# Patient Record
Sex: FEMALE | Race: WHITE
[De-identification: names, ages, dates, MRNs, and addresses within clinical notes are randomized per-mention and may not be internally consistent; named-entity substitution may affect disease eponyms.]

---

## 2019-02-24 ENCOUNTER — HOSPITAL ENCOUNTER (EMERGENCY)
Dept: HOSPITAL 75 - ER FS | Age: 69
Discharge: ANOTHER HOSPITAL | End: 2019-02-24
Payer: COMMERCIAL

## 2019-02-24 VITALS — DIASTOLIC BLOOD PRESSURE: 85 MMHG | SYSTOLIC BLOOD PRESSURE: 101 MMHG

## 2019-02-24 VITALS — BODY MASS INDEX: 29.16 KG/M2 | WEIGHT: 175 LBS | HEIGHT: 65 IN

## 2019-02-24 DIAGNOSIS — Z79.02: ICD-10-CM

## 2019-02-24 DIAGNOSIS — H54.7: Primary | ICD-10-CM

## 2019-02-24 DIAGNOSIS — M32.9: ICD-10-CM

## 2019-02-24 DIAGNOSIS — Z98.890: ICD-10-CM

## 2019-02-24 DIAGNOSIS — E05.90: ICD-10-CM

## 2019-02-24 DIAGNOSIS — M35.00: ICD-10-CM

## 2019-02-24 DIAGNOSIS — Z86.73: ICD-10-CM

## 2019-02-24 DIAGNOSIS — M33.20: ICD-10-CM

## 2019-02-24 DIAGNOSIS — Z79.82: ICD-10-CM

## 2019-02-24 DIAGNOSIS — I10: ICD-10-CM

## 2019-02-24 DIAGNOSIS — Z88.1: ICD-10-CM

## 2019-02-24 LAB
ALBUMIN SERPL-MCNC: 4.2 GM/DL (ref 3.2–4.5)
ALP SERPL-CCNC: 91 U/L (ref 40–136)
ALT SERPL-CCNC: 13 U/L (ref 0–55)
APTT BLD: 29 SEC (ref 24–35)
BASOPHILS # BLD AUTO: 0 10^3/UL (ref 0–0.1)
BASOPHILS NFR BLD AUTO: 0 % (ref 0–10)
BILIRUB SERPL-MCNC: 0.4 MG/DL (ref 0.1–1)
BUN/CREAT SERPL: 18
CALCIUM SERPL-MCNC: 9.5 MG/DL (ref 8.5–10.1)
CHLORIDE SERPL-SCNC: 102 MMOL/L (ref 98–107)
CO2 SERPL-SCNC: 26 MMOL/L (ref 21–32)
CREAT SERPL-MCNC: 0.79 MG/DL (ref 0.6–1.3)
EOSINOPHIL # BLD AUTO: 0 10^3/UL (ref 0–0.3)
EOSINOPHIL NFR BLD AUTO: 0 % (ref 0–10)
ERYTHROCYTE [DISTWIDTH] IN BLOOD BY AUTOMATED COUNT: 12.5 % (ref 10–14.5)
GFR SERPLBLD BASED ON 1.73 SQ M-ARVRAT: > 60 ML/MIN
GLUCOSE SERPL-MCNC: 110 MG/DL (ref 70–105)
HCT VFR BLD CALC: 47 % (ref 35–52)
HGB BLD-MCNC: 15.4 G/DL (ref 11.5–16)
INR PPP: 1 (ref 0.8–1.4)
LYMPHOCYTES # BLD AUTO: 2.9 X 10^3 (ref 1–4)
LYMPHOCYTES NFR BLD AUTO: 31 % (ref 12–44)
MAGNESIUM SERPL-MCNC: 1.9 MG/DL (ref 1.8–2.4)
MANUAL DIFFERENTIAL PERFORMED BLD QL: NO
MCH RBC QN AUTO: 30 PG (ref 25–34)
MCHC RBC AUTO-ENTMCNC: 33 G/DL (ref 32–36)
MCV RBC AUTO: 91 FL (ref 80–99)
MONOCYTES # BLD AUTO: 0.8 X 10^3 (ref 0–1)
MONOCYTES NFR BLD AUTO: 9 % (ref 0–12)
NEUTROPHILS # BLD AUTO: 5.6 X 10^3 (ref 1.8–7.8)
NEUTROPHILS NFR BLD AUTO: 60 % (ref 42–75)
PLATELET # BLD: 253 10^3/UL (ref 130–400)
PMV BLD AUTO: 13.7 FL (ref 7.4–10.4)
POTASSIUM SERPL-SCNC: 3.4 MMOL/L (ref 3.6–5)
PROT SERPL-MCNC: 7.4 GM/DL (ref 6.4–8.2)
PROTHROMBIN TIME: 13 SEC (ref 12.2–14.7)
SODIUM SERPL-SCNC: 142 MMOL/L (ref 135–145)
WBC # BLD AUTO: 9.4 10^3/UL (ref 4.3–11)

## 2019-02-24 PROCEDURE — 71045 X-RAY EXAM CHEST 1 VIEW: CPT

## 2019-02-24 PROCEDURE — 36415 COLL VENOUS BLD VENIPUNCTURE: CPT

## 2019-02-24 PROCEDURE — 93041 RHYTHM ECG TRACING: CPT

## 2019-02-24 PROCEDURE — 80053 COMPREHEN METABOLIC PANEL: CPT

## 2019-02-24 PROCEDURE — 85730 THROMBOPLASTIN TIME PARTIAL: CPT

## 2019-02-24 PROCEDURE — 83735 ASSAY OF MAGNESIUM: CPT

## 2019-02-24 PROCEDURE — 85610 PROTHROMBIN TIME: CPT

## 2019-02-24 PROCEDURE — 93005 ELECTROCARDIOGRAM TRACING: CPT

## 2019-02-24 PROCEDURE — 85025 COMPLETE CBC W/AUTO DIFF WBC: CPT

## 2019-02-24 PROCEDURE — 70450 CT HEAD/BRAIN W/O DYE: CPT

## 2019-02-24 PROCEDURE — 84484 ASSAY OF TROPONIN QUANT: CPT

## 2019-02-24 NOTE — XMS REPORT
[facility]

 Created on: 2018



Mohsen Sosa

External Reference #: [id number]

: 1950

Sex: Female



Demographics







 Address  [address] [place]  [address]

 

 Preferred Language  Unknown

 

 Marital Status  Unknown

 

 [employer] Affiliation  Unknown

 

 Race  Unknown

 

 Ethnic Group  Unknown





Author







 Author  JASONDARWIN TERRY

 

 Organization  [facility]

 

 Address  Unknown

 

 Phone  [phone]







Care Team Providers







 Care Team Member Name  Role  Phone

 

 DARWIN ORO  Unavailable  [phone]







PROBLEMS

Unknown Problems



ALLERGIES

No Information



ENCOUNTERS







 Encounter  Location  Date  Diagnosis

 

 [facility]  [id number], [place] 
[id number]    Dental examination Z01.20

 

 [facility]  [id number], [place] 
[id number]  08 May, 2015  Dental examination V72.2







IMMUNIZATIONS

No Known Immunizations



SOCIAL HISTORY

Never Assessed



REASON FOR VISIT

THA



PLAN OF CARE







 Activity  Details









  









 Follow Up  prn Reason:filling







VITAL SIGNS





MEDICATIONS







 Medication  Instructions  Dosage  Frequency  Start Date  End Date  Duration  
Status

 

 Fenofibrate                    Active

 

 Aspirin                    Active

 

 Xanax                    Active

 

 Plavix                    Active

 

 Lisinopril                    Active

 

 Synthroid                    Active

 

 Prolia                    Active







RESULTS

No Results



PROCEDURES







 Procedure  Date Ordered  Result  Body Site

 

 LTD ORAL EVALUATION - PROBLEM FOCUS  2017      

 

 INTRAORL-PERIAPICAL 1 FILM [id number]  2017      







INSTRUCTIONS





MEDICATIONS ADMINISTERED

No Known Medications

## 2019-02-24 NOTE — DIAGNOSTIC IMAGING REPORT
INDICATION: Hypertension 



COMPARISON: None.



FINDINGS:

Single view chest demonstrate some minimal cardiac enlargement.

Chronic interstitial changes are seen in both bases. There is no

pneumothorax, effusion or acute infiltrate. Osseous structures

are age-appropriate.



 IMPRESSION: Cardiac enlargement without pulmonary edema or acute

infiltrate.



Dictated by: 



  Dictated on workstation # [id number]

## 2019-02-24 NOTE — DIAGNOSTIC IMAGING REPORT
PROCEDURE: CT head without contrast.



TECHNIQUE: Multiple contiguous axial images were obtained through

the brain without the use of intravenous contrast.



INDICATION:  Headache and hypertension  



FINDINGS:

Ventricles normal in size, shape and position. There is no

midline shift or mass effect. There is no hemorrhage or evidence

of acute ischemia. There is no extra-axial fluid collection. The

paranasal sinuses, mastoids and bony calvarium is normal.



IMPRESSION: No acute intracranial abnormalities.



Dictated by: 



  Dictated on workstation # [id number]

## 2019-02-24 NOTE — ED NEUROLOGICAL PROBLEM
General


Chief Complaint:  Cardiac/General Problems


Stated Complaint:  HIGH BLOOD PRESSURE PER HOME TEST


Nursing Triage Note:  


ARRIVED VIA AMB TO ROOM 05.  COMPLAINS OF HYPERTENSION AND VISION CHANGES SINCE 


THURSDAY.  STATES SHE WAS SEEN AT URGENT CARE AND THEY THOUGHT IT WAS HER 


SINUSES


Nursing Sepsis Screen:  No Definite Risk





History of Present Illness


Date Seen by Provider:  Feb 24, 2019


Time Seen by Provider:  09:05


This is a 68-year-old female with a history of lupus, Sjogren syndrome, 

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 

secondary to bruising, hypertension, here for elevated blood pressure readings 

over the last 4 days. The highest value was 140/100 which was today at home. 

When she had strokes in the past she had difficulty with ambulation, some 

component of vertigo or ataxia. She is not having these symptoms recently 

however she does admit to having some generalized blurring her vision which has 

been slightly asymmetric worse on the left than on the right. No focal weakness

, numbness, or tingling. No headache. No neck pain. She has had some nasal 

congestion and a slightly scratchy throat, she was prescribed Augmentin when 

she was seen at urgent care but did not wind up taking this prescription. She 

has not had fevers, facial pain, purulent nasal discharge. No chest pain or 

shortness of breath. No other symptoms.





Allergies and Home Medications


Allergies


Coded Allergies:  


     cephalexin (Verified  Allergy, Unknown, 2/24/19)


     azithromycin (Verified  Adverse Reaction, Unknown, 2/24/19)


 


 STATES SHE CANT TAKE IT DUE TO TAKING PLAVIX


     doxycycline (Verified  Adverse Reaction, Unknown, REFLUX, 2/24/19)





Patient Home Medication List


Home Medication List Reviewed:  Yes





Review of Systems


Review of Systems


Constitutional:  no symptoms reported


Eyes:  Blurred Vision


Ears, Nose, Mouth, Throat:  see HPI


Respiratory:  no symptoms reported


Cardiovascular:  no symptoms reported


Gastrointestinal:  no symptoms reported


Genitourinary:  no symptoms reported


Musculoskeletal:  no symptoms reported


Skin:  no symptoms reported


Psychiatric/Neurological:  See HPI, Anxiety; Denies Headache


Endocrine:  See HPI


Hematologic/Lymphatic:  No Symptoms Reported





Past Medical-Social-Family Hx


Patient Social History


Recent Foreign Travel:  No


Contact w/Someone Who Travel:  No


Recent Infectious Disease Expo:  No


Recent Hopitalizations:  No





Seasonal Allergies


Seasonal Allergies:  No





Past Medical History


Gallbladder, Orthopedic


Respiratory:  No


Cardiac:  Yes


Hypertension


Neurological:  Yes


Stroke


Genitourinary:  No


Gastrointestinal:  No


Musculoskeletal:  No


Endocrine:  Yes (PIT TUMOR)


Hyperthyroidism


Cancer:  No


Psychosocial:  No


Integumentary:  No





Physical Exam


Vital Signs





Vital Signs - First Documented








 2/24/19





 08:25


 


Temp 98.0


 


Pulse 64


 


Resp 16


 


B/P (MAP) 156/76 (102)


 


Pulse Ox 98


 


O2 Delivery Room Air





Capillary Refill : Less Than 3 Seconds


Height, Weight, BMI


Height: 5'5.00"


Weight: 175lbs. oz. 79.109980gz;  BMI


Method:Stated


General Appearance:  no apparent distress


HEENT:  PERRL/EOMI, normal ENT inspection, pharynx normal, other (no tenderness 

of frontal or maxillary sinuses.  Funduscopic exam is grossly unremarkable w no 

papilledema, no flame hemorrhages or cotton wool spots)


Neck:  full range of motion; No carotid bruit


Respiratory:  lungs clear


Cardiovascular:  normal peripheral pulses (slightly decreased left radial pulse 

which is baseline patient states), regular rate, rhythm


Gastrointestinal:  non tender, soft


Neurologic/Psychiatric:  CNs II-XII nml as tested, no motor/sensory deficits, 

alert, normal mood/affect, oriented x 3; No abnormal cerebellar tests, No 

abnormal gait


Skin:  warm/dry





Progress/Results/Core Measures


Results/Orders


Lab Results





Laboratory Tests








Test


 2/24/19


09:37 Range/Units


 


 


White Blood Count


 9.4 


 4.3-11.0


10^3/uL


 


Red Blood Count


 5.22 


 4.35-5.85


10^6/uL


 


Hemoglobin 15.4  11.5-16.0  G/DL


 


Hematocrit 47  35-52  %


 


Mean Corpuscular Volume 91  80-99  FL


 


Mean Corpuscular Hemoglobin 30  25-34  PG


 


Mean Corpuscular Hemoglobin


Concent 33 


 32-36  G/DL





 


Red Cell Distribution Width 12.5  10.0-14.5  %


 


Platelet Count


 253 


 130-400


10^3/uL


 


Mean Platelet Volume 13.7 H 7.4-10.4  FL


 


Neutrophils (%) (Auto) 60  42-75  %


 


Lymphocytes (%) (Auto) 31  12-44  %


 


Monocytes (%) (Auto) 9  0-12  %


 


Eosinophils (%) (Auto) 0  0-10  %


 


Basophils (%) (Auto) 0  0-10  %


 


Neutrophils # (Auto) 5.6  1.8-7.8  X 10^3


 


Lymphocytes # (Auto) 2.9  1.0-4.0  X 10^3


 


Monocytes # (Auto) 0.8  0.0-1.0  X 10^3


 


Eosinophils # (Auto)


 0.0 


 0.0-0.3


10^3/uL


 


Basophils # (Auto)


 0.0 


 0.0-0.1


10^3/uL


 


Prothrombin Time 13.0  12.2-14.7  SEC


 


INR Comment 1.0  0.8-1.4  


 


Activated Partial


Thromboplast Time 29 


 24-35  SEC





 


Sodium Level 142  135-145  MMOL/L


 


Potassium Level 3.4 L 3.6-5.0  MMOL/L


 


Chloride Level 102    MMOL/L


 


Carbon Dioxide Level 26  21-32  MMOL/L


 


Anion Gap 14  5-14  MMOL/L


 


Blood Urea Nitrogen 14  7-18  MG/DL


 


Creatinine


 0.79 


 0.60-1.30


MG/DL


 


Estimat Glomerular Filtration


Rate > 60 


  





 


BUN/Creatinine Ratio 18   


 


Glucose Level 110 H   MG/DL


 


Calcium Level 9.5  8.5-10.1  MG/DL


 


Corrected Calcium 9.3  8.5-10.1  MG/DL


 


Magnesium Level 1.9  1.8-2.4  MG/DL


 


Total Bilirubin 0.4  0.1-1.0  MG/DL


 


Aspartate Amino Transf


(AST/SGOT) 21 


 5-34  U/L





 


Alanine Aminotransferase


(ALT/SGPT) 13 


 0-55  U/L





 


Alkaline Phosphatase 91    U/L


 


Troponin T 10  <=10  NG/L


 


Total Protein 7.4  6.4-8.2  GM/DL


 


Albumin 4.2  3.2-4.5  GM/DL








My Orders





Orders - KALEB FOUNTAIN DO


Cbc With Automated Diff (2/24/19 09:14)


Magnesium (2/24/19 09:14)


Ekg Tracing (2/24/19 09:14)


Comprehensive Metabolic Panel (2/24/19 09:14)


Protime With Inr (2/24/19 09:14)


Partial Thromboplastin Time (2/24/19 09:14)


O2 (2/24/19 09:14)


Monitor-Rhythm Ecg Trace Only (2/24/19 09:14)


Saline Lock/Iv-Start (2/24/19 09:14)


Ct Head Wo (2/24/19 09:15)


Chest 1 View Ap/Pa Only (2/24/19 11:09)


Aspirin Chewable Tablet (Baby Aspirin Ch (2/24/19 11:12)





Vital Signs/I&O











 2/24/19





 08:25


 


Temp 98.0


 


Pulse 64


 


Resp 16


 


B/P (MAP) 156/76 (102)


 


Pulse Ox 98


 


O2 Delivery Room Air














Blood Pressure Mean:  102











Progress


Progress Note #1:  


Progress Note


This is a very pleasant 68-year-old female with somewhat complex medical 

history as documented in history of present illness, significant for posterior 

stroke in the past and known pituitary tumor, here complaining of elevated 

blood pressure readings for the last 4 days. Her only neurologic symptom is 

mild generalized blurring of her visual field, and has a normal neurologic exam 

at this time including testing of all 4 visual quadrants. Blood pressure now is 

varying from 130//80 approximately.  She will require CT of the brain now

, we will obtain basic labs to evaluate for acute kidney injury secondary to 

hypertensive emergency, anemia from intravascular hemolysis, and we will check 

a troponin and ECG although patient has not had symptoms that are specifically 

suggestive of ACS.  While slightly blurry vision in the setting of moderate 

hypertension is nonspecific, given her history further testing is indicated 

including MRI, MRA, and likely neurologic consult.


Progress Note #2:  


Progress Note


I discussed the case with Dr. Ngo from neurology and [facility] in 

[place] where patient has followed up with her endocrinologist for neuroimaging 

related to her pituitary tumor. Dr. Ngo's concern was that patient may 

have a vasculitic etiology of her symptoms and her history of lupus and Sjogren 

syndrome, her recommendation was to transfer patient to . At approximately 11:

03 AM I spoke to Dr. Woodson the stroke neurologist on call at  neuro who 

felt that this transfer was appropriate, transfer center was to be calling us 

back with arrangements.


Progress Note #3:  


Progress Note


At 11:30 AM I spoke to Dr. Figueroa also from neurology at  and he accepts the 

patient to his service. No further recommendations at this time.


Progress Note #4:  


Progress Note


We were notified that the neurology service actually was capped, so patient was 

accepted to the internal medicine service under Dr. Serrano at about 12:10pm.


EKG :  


Comment


O9 40: Sinus bradycardia rate of 59. Normal axis. Slightly low voltages without 

electrical alternans.





Diagnostic Imaging





   Diagonstic Imaging:  CT





Critical Care Note


Critical Care


Start Time:  10:32


Stop Time:  11:07


Total Time (minutes)


35


Progress


Critical care time is exclusive of time spent on separately billable 

procedures. Organ systems at risk from possible stroke, hypertension. Time 

spent at bedside, interpreting imaging, EKG, lab and imaging results, 

discussion with medical specialists at several facilities.





Departure


Impression





 Primary Impression:  


 Diminished vision


 Additional Impressions:  


 Hypertension


 History of CVA (cerebrovascular accident)


 Hx of systemic lupus erythematosus (SLE)


 Hx of Sjogren's disease


Disposition:  02 XFER SHT-TRM HOSP


Condition:  Stable





Transfer


Time Spoke to Accepting Phy:  12:15


Transfer Facility:  


[facility] (Accepted by Dr Serrano)


Method of Transfer:  EMS





Departure-Patient Inst.


Referrals:  


GIL BARROW DO (PCP/Family)


Primary Care Physician











KALEB FOUNTAIN DO Feb 24, 2019 09:27

## 2019-02-24 NOTE — NUR
GAYLA CALLED ET WITH ROOM NUMBER AND REPORT NUMBER ET WHEN ATTEMPTING TO CALL 
REPORT THAT NURSE STATES SHE IS UNABLE TO TAKE THIS PT ET GAYLA WOULD CALL US BACK 
WITH A NEW BED NUMBER.

## 2019-03-26 ENCOUNTER — HOSPITAL ENCOUNTER (OUTPATIENT)
Dept: HOSPITAL 75 - RAD | Age: 69
End: 2019-03-26
Attending: INTERNAL MEDICINE
Payer: MEDICARE

## 2019-03-26 DIAGNOSIS — Z78.0: ICD-10-CM

## 2019-03-26 DIAGNOSIS — M81.0: ICD-10-CM

## 2019-03-26 DIAGNOSIS — Z13.820: Primary | ICD-10-CM

## 2019-03-26 PROCEDURE — 77080 DXA BONE DENSITY AXIAL: CPT

## 2019-03-26 NOTE — DIAGNOSTIC IMAGING REPORT
INDICATION: Postmenopausal screening.



COMPARISON: None.



FINDINGS:



AP Spine L1-L4:  

[BMD (g/cm2): 0.923] [T-Score: -2.3] [Z-Score: -1.2]

[BMD Previous: N/A] [BMD % Change: N/A]



LT Hip Neck:       

[BMD (g/cm2): 0.579] [T-Score: -3.3] [Z-Score: -2.0]



LT Hip Total:       

[BMD (g/cm2):0.662] [T-Score:-2.7] [Z-Score: -1.8]

[BMD Previous: N/A] [BMD % Change: N/A]



RT Hip Neck:      

[BMD (g/cm2):N/A] [T-Score:N/A] [Z-Score:N/A]



RT Hip Total:      

[BMD (g/cm2):N/A] [T-score:N/A] [Z-Score:N/A]

[BMD Previous:N/A] [BMD % Change:N/A]



*Indicates significant change from prior examination based on 95%

confidence level.



World Health Organization criteria for BMD interpretation

classify patients as Normal (T-score at or above -1.0),

Osteopenic (T-score between -1.0 and -2.5) or Osteoporotic

(T-score at or below -2.5).



LIMITATIONS AND MODIFICATION:  None.



FRACTURE RISK (FRAX SCORE):

The ten year probability of (%): 

Major Osteoporotic Fracture: [30.5]

Hip Fracture: [11.1]



IMPRESSION:

1. Osteoporosis.

2. Baseline examination.

3. See below National Osteoporosis Foundation guidelines on when

to potentially initiate pharmacologic therapy. 



Based on the National Osteoporosis Foundation Guidelines,

pharmacologic treatment should be initiated in any of the

following, unless clinical conditions suggest otherwise:



*  Any patient with prior fragility fracture of the hip or

vertebrae. A spine fracture indicates 5X risk for subsequent

spine fracture and 2X risk for subsequent hip fracture.



*  Osteoporosis (T-score <-2.5).



*  Postmenopausal women and men age 50 and older with low bone

mass/osteopenia (T-score between -1.0 and -2.5) by DXA and

10-year major osteoporotic fracture greater than 20% or a 10-year

probability of hip fracture greater than 3%. These fracture risks

are supplied above in the FRAX score, if applicable.



*  Clinician judgement and/or patient preferences may indicate

treatment for people with 10-year fracture probabilities above or

below these levels.



Dictated by: 



  Dictated on workstation # [id number]

## 2020-07-27 ENCOUNTER — HOSPITAL ENCOUNTER (OUTPATIENT)
Dept: HOSPITAL 75 - RAD FS | Age: 70
End: 2020-07-27
Attending: NURSE PRACTITIONER
Payer: MEDICARE

## 2020-07-27 DIAGNOSIS — R19.4: Primary | ICD-10-CM

## 2020-07-27 PROCEDURE — 74019 RADEX ABDOMEN 2 VIEWS: CPT

## 2020-07-27 NOTE — DIAGNOSTIC IMAGING REPORT
INDICATION: 

Abdominal pain and change in bowel habits.



TIME OF EXAM: 

10:42 AM.



FINDINGS:

Upright and supine radiographs of the abdomen were obtained.

There is no free air. The bowel gas pattern appears

nonobstructed. No pathologic calcifications are seen. Postop

changes to the right hip are noted.



IMPRESSION: 

No acute abnormality is detected.



Dictated by: 



  Dictated on workstation # [id number]

## 2020-09-16 ENCOUNTER — HOSPITAL ENCOUNTER (OUTPATIENT)
Dept: HOSPITAL 75 - LAB FS | Age: 70
End: 2020-09-16
Attending: NURSE PRACTITIONER
Payer: MEDICARE

## 2020-09-16 DIAGNOSIS — Z87.19: ICD-10-CM

## 2020-09-16 DIAGNOSIS — R10.32: Primary | ICD-10-CM

## 2020-09-16 LAB
ALBUMIN SERPL-MCNC: 3.5 GM/DL (ref 3.2–4.5)
ALP SERPL-CCNC: 77 U/L (ref 40–136)
ALT SERPL-CCNC: 8 U/L (ref 0–55)
BILIRUB SERPL-MCNC: 0.4 MG/DL (ref 0.1–1)
BUN/CREAT SERPL: 13
CALCIUM SERPL-MCNC: 9.3 MG/DL (ref 8.5–10.1)
CHLORIDE SERPL-SCNC: 104 MMOL/L (ref 98–107)
CO2 SERPL-SCNC: 26 MMOL/L (ref 21–32)
CREAT SERPL-MCNC: 0.67 MG/DL (ref 0.6–1.3)
GFR SERPLBLD BASED ON 1.73 SQ M-ARVRAT: > 60 ML/MIN
GLUCOSE SERPL-MCNC: 110 MG/DL (ref 70–105)
POTASSIUM SERPL-SCNC: 3.7 MMOL/L (ref 3.6–5)
PROT SERPL-MCNC: 6.8 GM/DL (ref 6.4–8.2)
SODIUM SERPL-SCNC: 141 MMOL/L (ref 135–145)

## 2020-09-16 PROCEDURE — 74177 CT ABD & PELVIS W/CONTRAST: CPT

## 2020-09-16 PROCEDURE — 80053 COMPREHEN METABOLIC PANEL: CPT

## 2020-09-16 PROCEDURE — 36415 COLL VENOUS BLD VENIPUNCTURE: CPT

## 2020-09-16 NOTE — DIAGNOSTIC IMAGING REPORT
PROCEDURE: CT abdomen and pelvis with contrast.



TECHNIQUE: Multiple contiguous axial images were obtained through

the abdomen and pelvis after administration of intravenous

contrast. Auto Exposure Controls were utilized during the CT exam

to meet ALARA standards for radiation dose reduction. 



INDICATION: Left lower quadrant pain for two months.



COMPARISON: No prior studies are available for comparison.



FINDINGS: Lung bases demonstrate some generalized interstitial

changes. No discrete liver mass is detected. Gallbladder is

surgically absent. No biliary ductal dilatation is seen. Pancreas

is unremarkable. There is a cystic collection along the surface

of the spleen measuring 4.1 cm AP x 2.6 cm cephalocaudal x 4.2 cm

transverse. This is suggestive of a subcapsular collection such

as subcapsular hematoma or seroma. Patient appears to have had

prior splenic embolization. Adrenal glands are unremarkable. A

small cortical low density in the upper pole of the left kidney

is noted measuring 15 mm and suggestive of a cyst. Aorta and

iliac vessels are heavily calcified but nonaneurysmal. The small

and large bowel loops are normal in caliber. No obstruction is

seen. There is marked diverticulosis of the sigmoid colon. In

addition, the sigmoid colon appears to be thick walled and there

is perisigmoidal inflammatory stranding present, consistent with

acute diverticulitis. There is a cystic collection just inferior

to the left of the inflamed sigmoid colon measuring 6.8 x 5.5 cm.

This appears to displace the uterus to the right. Bladder is

unremarkable. No free fluid is identified.



IMPRESSION:

1. Postop changes to the spleen. There is a subcapsular fluid

collection involving the spleen, perhaps subcapsular hematoma or

seroma.

2. Findings consistent with sigmoid diverticulitis. There is a

fluid collection just inferior and to the left of the inflamed

sigmoid loop in the left pelvis which does contain some

peripheral calcifications. This could potentially represent a

diverticular abscess, although no gas within the collection is

identified. Benign pelvic cyst would be an additional

consideration due to its location within the left adnexa. No free

air or bowel obstruction is seen. Correlation with pelvic

sonography would be useful to further characterize the fluid

collection in the left pelvis.



Dictated by: 



  Dictated on workstation # [id number]

## 2020-09-21 ENCOUNTER — HOSPITAL ENCOUNTER (EMERGENCY)
Dept: HOSPITAL 75 - ER FS | Age: 70
Discharge: HOME | End: 2020-09-21
Payer: MEDICARE

## 2020-09-21 VITALS — HEIGHT: 60.63 IN | BODY MASS INDEX: 33.73 KG/M2 | WEIGHT: 176.37 LBS

## 2020-09-21 VITALS — SYSTOLIC BLOOD PRESSURE: 112 MMHG | DIASTOLIC BLOOD PRESSURE: 72 MMHG

## 2020-09-21 DIAGNOSIS — R11.0: ICD-10-CM

## 2020-09-21 DIAGNOSIS — Z88.1: ICD-10-CM

## 2020-09-21 DIAGNOSIS — E87.6: ICD-10-CM

## 2020-09-21 DIAGNOSIS — K57.32: Primary | ICD-10-CM

## 2020-09-21 DIAGNOSIS — Z88.2: ICD-10-CM

## 2020-09-21 LAB
ALBUMIN SERPL-MCNC: 3.6 GM/DL (ref 3.2–4.5)
ALP SERPL-CCNC: 84 U/L (ref 40–136)
ALT SERPL-CCNC: 9 U/L (ref 0–55)
APTT BLD: 30 SEC (ref 24–35)
BASOPHILS # BLD AUTO: 0 10^3/UL (ref 0–0.1)
BASOPHILS NFR BLD AUTO: 0 % (ref 0–10)
BILIRUB SERPL-MCNC: 0.3 MG/DL (ref 0.1–1)
BUN/CREAT SERPL: 13
CALCIUM SERPL-MCNC: 9.2 MG/DL (ref 8.5–10.1)
CHLORIDE SERPL-SCNC: 102 MMOL/L (ref 98–107)
CO2 SERPL-SCNC: 26 MMOL/L (ref 21–32)
CREAT SERPL-MCNC: 0.6 MG/DL (ref 0.6–1.3)
EOSINOPHIL # BLD AUTO: 0 10^3/UL (ref 0–0.3)
EOSINOPHIL NFR BLD AUTO: 0 % (ref 0–10)
GFR SERPLBLD BASED ON 1.73 SQ M-ARVRAT: > 60 ML/MIN
GLUCOSE SERPL-MCNC: 126 MG/DL (ref 70–105)
HCT VFR BLD CALC: 45 % (ref 35–52)
HGB BLD-MCNC: 14.5 G/DL (ref 11.5–16)
INR PPP: 1 (ref 0.8–1.4)
LYMPHOCYTES # BLD AUTO: 2.9 X 10^3 (ref 1–4)
LYMPHOCYTES NFR BLD AUTO: 28 % (ref 12–44)
MANUAL DIFFERENTIAL PERFORMED BLD QL: NO
MCH RBC QN AUTO: 28 PG (ref 25–34)
MCHC RBC AUTO-ENTMCNC: 32 G/DL (ref 32–36)
MCV RBC AUTO: 88 FL (ref 80–99)
MONOCYTES # BLD AUTO: 0.7 X 10^3 (ref 0–1)
MONOCYTES NFR BLD AUTO: 7 % (ref 0–12)
NEUTROPHILS # BLD AUTO: 6.7 X 10^3 (ref 1.8–7.8)
NEUTROPHILS NFR BLD AUTO: 65 % (ref 42–75)
PLATELET # BLD: 329 10^3/UL (ref 130–400)
PMV BLD AUTO: 13.2 FL (ref 7.4–10.4)
POTASSIUM SERPL-SCNC: 3.2 MMOL/L (ref 3.6–5)
PROT SERPL-MCNC: 6.8 GM/DL (ref 6.4–8.2)
PROTHROMBIN TIME: 13 SEC (ref 12.2–14.7)
SODIUM SERPL-SCNC: 141 MMOL/L (ref 135–145)
WBC # BLD AUTO: 10.4 10^3/UL (ref 4.3–11)

## 2020-09-21 PROCEDURE — 36415 COLL VENOUS BLD VENIPUNCTURE: CPT

## 2020-09-21 PROCEDURE — 85025 COMPLETE CBC W/AUTO DIFF WBC: CPT

## 2020-09-21 PROCEDURE — 74177 CT ABD & PELVIS W/CONTRAST: CPT

## 2020-09-21 PROCEDURE — 85730 THROMBOPLASTIN TIME PARTIAL: CPT

## 2020-09-21 PROCEDURE — 85610 PROTHROMBIN TIME: CPT

## 2020-09-21 PROCEDURE — 80053 COMPREHEN METABOLIC PANEL: CPT

## 2020-09-21 NOTE — ED GENERAL
General


Stated Complaint:  ABD PAIN





History of Present Illness


Date Seen by Provider:  Sep 21, 2020


Time Seen by Provider:  10:48


Initial Comments


Patient presenting to emergency department for evaluation of worsening left 

lower quadrant pain with nausea and weight loss and generally not feeling well. 

She says that she has been dealing with this for the past 2 months and has been 

on 4 rounds of antibiotics including Bactrim and Flagyl Levaquin and currently 

she is taking Augmentin.  She had a CT scan done on September 16 which is 5 days

ago from today and it was discovered that she does have acute sigmoid 

diverticulitis as well as concern for a fluid collection.  She says that her 

pain and nausea have not been controlled at home and that is why she is 

presenting to emergency department today.  She is in no obvious distress with 

normal vital signs.





Allergies and Home Medications


Allergies


Coded Allergies:  


     Sulfa (Sulfonamide Antibiotics) (Unverified  Allergy, Unknown, 9/16/20)


     cephalexin (Verified  Allergy, Unknown, 2/24/19)


     azithromycin (Verified  Adverse Reaction, Unknown, 2/24/19)


   


   STATES SHE CANT TAKE IT DUE TO TAKING PLAVIX


     doxycycline (Verified  Adverse Reaction, Unknown, REFLUX, 2/24/19)





Patient Home Medication List


Home Medication List Reviewed:  Yes





Review of Systems


Review of Systems


Constitutional:  weight loss


EENTM:  no symptoms reported


Respiratory:  no symptoms reported


Cardiovascular:  no symptoms reported


Gastrointestinal:  abdominal pain (LLQ), nausea


Genitourinary:  no symptoms reported


Musculoskeletal:  no symptoms reported


Skin:  no symptoms reported


Psychiatric/Neurological:  No Symptoms Reported





All Other Systems Reviewed


Negative Unless Noted:  Yes





Past Medical-Social-Family Hx


Patient Social History


Recent Foreign Travel:  No


Contact w/Someone Who Travel:  No


Recent Hopitalizations:  No





Seasonal Allergies


Seasonal Allergies:  No





Past Medical History


Gallbladder, Orthopedic


Respiratory:  No


Cardiac:  Yes


Hypertension


Neurological:  Yes


Stroke


Genitourinary:  No


Gastrointestinal:  No


Musculoskeletal:  No


Endocrine:  Yes (PIT TUMOR)


Hyperthyroidism


Cancer:  No


Psychosocial:  No


Integumentary:  No





Physical Exam


Vital Signs





Vital Signs - First Documented








 9/21/20





 10:20


 


Temp 36.3


 


Pulse 76


 


Resp 18


 


B/P (MAP) 150/85 (106)


 


Pulse Ox 98


 


O2 Delivery Room Air





Capillary Refill :


Height, Weight, BMI


Height: 5'5.00"


Weight: 175lbs. oz. 79.931119oa;  BMI


Method:Stated


General Appearance:  No Apparent Distress, WD/WN


Neck:  Supple


Respiratory:  No Respiratory Distress


Cardiovascular:  Regular Rate, Rhythm


Gastrointestinal:  Soft; No Guarding, No Rebound; Tenderness (LLQ)


Back:  Normal Inspection


Extremity:  Normal Capillary Refill


Neurologic/Psychiatric:  Alert, Oriented x3


Skin:  Warm/Dry





Progress/Results/Core Measures


Suspected Sepsis


SIRS


Temperature: 


Pulse:  


Respiratory Rate: 


 


Laboratory Tests


9/21/20 10:40: White Blood Count 10.4


Blood Pressure  / 


Mean: 


 





Laboratory Tests


9/21/20 10:40: 


Creatinine 0.60, INR Comment 1.0, Platelet Count 329, Total Bilirubin 0.3








Results/Orders


Lab Results





Laboratory Tests








Test


 9/21/20


10:40 Range/Units


 


 


White Blood Count


 10.4 


 4.3-11.0


10^3/uL


 


Red Blood Count


 5.10 


 4.35-5.85


10^6/uL


 


Hemoglobin 14.5  11.5-16.0  G/DL


 


Hematocrit 45  35-52  %


 


Mean Corpuscular Volume 88  80-99  FL


 


Mean Corpuscular Hemoglobin 28  25-34  PG


 


Mean Corpuscular Hemoglobin


Concent 32 


 32-36  G/DL





 


Red Cell Distribution Width 13.5  10.0-14.5  %


 


Platelet Count


 329 


 130-400


10^3/uL


 


Mean Platelet Volume 13.2 H 7.4-10.4  FL


 


Neutrophils (%) (Auto) 65  42-75  %


 


Lymphocytes (%) (Auto) 28  12-44  %


 


Monocytes (%) (Auto) 7  0-12  %


 


Eosinophils (%) (Auto) 0  0-10  %


 


Basophils (%) (Auto) 0  0-10  %


 


Neutrophils # (Auto) 6.7  1.8-7.8  X 10^3


 


Lymphocytes # (Auto) 2.9  1.0-4.0  X 10^3


 


Monocytes # (Auto) 0.7  0.0-1.0  X 10^3


 


Eosinophils # (Auto)


 0.0 


 0.0-0.3


10^3/uL


 


Basophils # (Auto)


 0.0 


 0.0-0.1


10^3/uL


 


Prothrombin Time 13.0  12.2-14.7  SEC


 


INR Comment 1.0  0.8-1.4  


 


Activated Partial


Thromboplast Time 30 


 24-35  SEC





 


Sodium Level 141  135-145  MMOL/L


 


Potassium Level 3.2 L 3.6-5.0  MMOL/L


 


Chloride Level 102    MMOL/L


 


Carbon Dioxide Level 26  21-32  MMOL/L


 


Anion Gap 13  5-14  MMOL/L


 


Blood Urea Nitrogen 8  7-18  MG/DL


 


Creatinine


 0.60 


 0.60-1.30


MG/DL


 


Estimat Glomerular Filtration


Rate > 60 


  





 


BUN/Creatinine Ratio 13   


 


Glucose Level 126 H   MG/DL


 


Calcium Level 9.2  8.5-10.1  MG/DL


 


Corrected Calcium 9.5  8.5-10.1  MG/DL


 


Total Bilirubin 0.3  0.1-1.0  MG/DL


 


Aspartate Amino Transf


(AST/SGOT) 18 


 5-34  U/L





 


Alanine Aminotransferase


(ALT/SGPT) 9 


 0-55  U/L





 


Alkaline Phosphatase 84    U/L


 


Total Protein 6.8  6.4-8.2  GM/DL


 


Albumin 3.6  3.2-4.5  GM/DL








My Orders





Orders - OPHELIA CHINCHILLA DO


Cbc With Automated Diff (9/21/20 10:36)


Comprehensive Metabolic Panel (9/21/20 10:36)


Partial Thromboplastin Time (9/21/20 10:36)


Protime With Inr (9/21/20 10:36)


Iv/Invasive Line Insertion .IV start (9/21/20 10:36)


Fentanyl  Injection (Sublimaze Injection (9/21/20 10:45)


Ondansetron Injection (Zofran Injectio (9/21/20 10:45)


Ns Iv 1000 Ml (Sodium Chloride 0.9%) (9/21/20 10:45)


Ct Abdomen/Pelvis W (9/21/20 10:48)


Iohexol Injection (Omnipaque 350 Mg/Ml 1 (9/21/20 11:30)


Received Contrast (Hold Metformin- Contr (9/21/20 11:30)


Sodium Chloride Flush (Catheter Flush Sy (9/21/20 11:30)


Ns (Ivpb) (Sodium Chloride 0.9% Ivpb Bag (9/21/20 11:30)


Potassium Chloride (Tablet) (K Dur Table (9/21/20 11:45)





Medications Given in ED





Current Medications








 Medications  Dose


 Ordered  Sig/Rashaad


 Route  Start Time


 Stop Time Status Last Admin


Dose Admin


 


 Fentanyl Citrate  50 mcg  ONCE  ONCE


 IVP  9/21/20 10:45


 9/21/20 10:46 DC 9/21/20 10:49


50 MCG


 


 Iohexol  100 ml  ONCE  ONCE


 IV  9/21/20 11:30


 9/21/20 11:31 DC 9/21/20 11:27


100 ML


 


 Ondansetron HCl  4 mg  ONCE  ONCE


 IVP  9/21/20 10:45


 9/21/20 10:46 DC 9/21/20 10:48


4 MG


 


 Sodium Chloride  10 ml  AS NEEDED  PRN


 IV  9/21/20 11:30


    9/21/20 11:27


10 ML


 


 Sodium Chloride  100 ml  ONCE  ONCE


 IV  9/21/20 11:30


 9/21/20 11:31 DC 9/21/20 11:27


80 ML








Vital Signs/I&O











 9/21/20





 10:20


 


Temp 36.3


 


Pulse 76


 


Resp 18


 


B/P (MAP) 150/85 (106)


 


Pulse Ox 98


 


O2 Delivery Room Air





Capillary Refill :


Progress Note :  


Progress Note


Given her worsening pain and signs of a fluid collection unless CT I am going to

repeat the CT to see if the fluid collection is worsening.  If she has 

leukocytosis and worsening fluid collection she will likely require admission to

the hospital for IV antibiotics and likely surgical consultation.  If she is 

improving she may be able to continue her current treatment with better outpati

ent treatment of her symptoms and following up with surgery in clinic.





I spoke to Dr. Bain and he reviewed the labs and imaging and based off the 

results feels that she can be safely discharged as an outpatient.  Her abscess 

appears to be improving and there is no signs of perforation.  No leukocytosis. 

Her repeat abdominal exam is benign with no rebound or guarding and she says her

pain and nausea is much improved and she can tolerate fluids by mouth with no 

difficulty.  She is not taking anything for pain or nausea at this time as she 

was not prescribed anything so I will discharge her on Norco and Zofran and told

her to drink plenty of fluids and focus on a liquid diet.  We were trying to 

arrange surgery follow-up for her later this week if she still may need a 

hemicolectomy but there is no emergent surgical pathology going on at this time 

and she will be discharged in stable condition and told to return with worsening

pain fevers vomiting or other general concerns.  Patient aware and agreeable 

with plan for discharge and verbalized understanding of the above instructions.





Departure


Impression





   Primary Impression:  


   Diverticulitis of intestine


   Qualified Codes:  K57.20 - Diverticulitis of large intestine with perforation

   and abscess without bleeding


   Additional Impressions:  


   Abdominal pain


   Nausea alone


   Hypokalemia


Disposition:  01 HOME, SELF-CARE


Condition:  Stable





Departure-Patient Inst.


Referrals:  


SELF,RIGO HICKS (PCP/Family)


Primary Care Physician


Patient Instructions:  Diverticulitis (DC)





Add. Discharge Instructions:  


Liquid diet, with plenty of gatorade


Scripts


Ondansetron (Ondansetron Odt) 4 Mg Tab.rapdis


4 MG PO Q6H PRN for NAUSEA/VOMITING-1ST LINE, #20 TAB


   Prov: OPHELIA CHINCHILLA DO         9/21/20 


Hydrocodone/Acetaminophen (Hydrocodone-Acetamin 5-325 mg) 1 Each Tablet


1 EACH PO Q6H PRN for PAIN-SEVERE (8-10), #20 TAB


   Prov: OPHELIA CHINCHILLA DO         9/21/20











OPHELIA CHINCHILLA DO             Sep 21, 2020 10:28

## 2020-10-13 ENCOUNTER — HOSPITAL ENCOUNTER (OUTPATIENT)
Dept: HOSPITAL 75 - LAB FS | Age: 70
End: 2020-10-13
Attending: INTERNAL MEDICINE
Payer: MEDICARE

## 2020-10-13 DIAGNOSIS — Z79.2: ICD-10-CM

## 2020-10-13 DIAGNOSIS — K57.92: Primary | ICD-10-CM

## 2020-10-13 LAB
ALBUMIN SERPL-MCNC: 2.7 GM/DL (ref 3.2–4.5)
ALP SERPL-CCNC: 67 U/L (ref 40–136)
ALT SERPL-CCNC: 5 U/L (ref 0–55)
BASOPHILS # BLD AUTO: 0 10^3/UL (ref 0–0.1)
BASOPHILS NFR BLD AUTO: 0 % (ref 0–10)
BILIRUB SERPL-MCNC: 0.2 MG/DL (ref 0.1–1)
BUN/CREAT SERPL: 12
CALCIUM SERPL-MCNC: 8.5 MG/DL (ref 8.5–10.1)
CHLORIDE SERPL-SCNC: 108 MMOL/L (ref 98–107)
CO2 SERPL-SCNC: 22 MMOL/L (ref 21–32)
CREAT SERPL-MCNC: 2.15 MG/DL (ref 0.6–1.3)
EOSINOPHIL # BLD AUTO: 0 10^3/UL (ref 0–0.3)
EOSINOPHIL NFR BLD AUTO: 0 % (ref 0–10)
ERYTHROCYTE [SEDIMENTATION RATE] IN BLOOD: 24 MM/HR (ref 0–30)
GFR SERPLBLD BASED ON 1.73 SQ M-ARVRAT: 23 ML/MIN
GLUCOSE SERPL-MCNC: 48 MG/DL (ref 70–105)
HCT VFR BLD CALC: 33 % (ref 35–52)
HGB BLD-MCNC: 10.5 G/DL (ref 11.5–16)
LYMPHOCYTES # BLD AUTO: 2.2 X 10^3 (ref 1–4)
LYMPHOCYTES NFR BLD AUTO: 20 % (ref 12–44)
MANUAL DIFFERENTIAL PERFORMED BLD QL: NO
MCH RBC QN AUTO: 28 PG (ref 25–34)
MCHC RBC AUTO-ENTMCNC: 32 G/DL (ref 32–36)
MCV RBC AUTO: 89 FL (ref 80–99)
MONOCYTES # BLD AUTO: 0.9 X 10^3 (ref 0–1)
MONOCYTES NFR BLD AUTO: 8 % (ref 0–12)
NEUTROPHILS # BLD AUTO: 7.8 X 10^3 (ref 1.8–7.8)
NEUTROPHILS NFR BLD AUTO: 71 % (ref 42–75)
PLATELET # BLD: 366 10^3/UL (ref 130–400)
PMV BLD AUTO: 13.6 FL (ref 7.4–10.4)
POTASSIUM SERPL-SCNC: 3.3 MMOL/L (ref 3.6–5)
PROT SERPL-MCNC: 5.4 GM/DL (ref 6.4–8.2)
SODIUM SERPL-SCNC: 146 MMOL/L (ref 135–145)
WBC # BLD AUTO: 11 10^3/UL (ref 4.3–11)

## 2020-10-13 PROCEDURE — 80053 COMPREHEN METABOLIC PANEL: CPT

## 2020-10-13 PROCEDURE — 85652 RBC SED RATE AUTOMATED: CPT

## 2020-10-13 PROCEDURE — 86141 C-REACTIVE PROTEIN HS: CPT

## 2020-10-13 PROCEDURE — 85025 COMPLETE CBC W/AUTO DIFF WBC: CPT

## 2020-10-13 PROCEDURE — 36415 COLL VENOUS BLD VENIPUNCTURE: CPT

## 2020-11-20 ENCOUNTER — HOSPITAL ENCOUNTER (OUTPATIENT)
Dept: HOSPITAL 75 - LAB FS | Age: 70
End: 2020-11-20
Payer: MEDICARE

## 2020-11-20 DIAGNOSIS — E78.6: ICD-10-CM

## 2020-11-20 DIAGNOSIS — N17.9: Primary | ICD-10-CM

## 2020-11-20 LAB
BUN/CREAT SERPL: 11
CALCIUM SERPL-MCNC: 8.7 MG/DL (ref 8.5–10.1)
CHLORIDE SERPL-SCNC: 103 MMOL/L (ref 98–107)
CO2 SERPL-SCNC: 27 MMOL/L (ref 21–32)
CREAT SERPL-MCNC: 0.71 MG/DL (ref 0.6–1.3)
GFR SERPLBLD BASED ON 1.73 SQ M-ARVRAT: > 60 ML/MIN
GLUCOSE SERPL-MCNC: 97 MG/DL (ref 70–105)
POTASSIUM SERPL-SCNC: 3.2 MMOL/L (ref 3.6–5)
SODIUM SERPL-SCNC: 142 MMOL/L (ref 135–145)

## 2020-11-20 PROCEDURE — 80048 BASIC METABOLIC PNL TOTAL CA: CPT

## 2020-11-20 PROCEDURE — 36415 COLL VENOUS BLD VENIPUNCTURE: CPT

## 2020-11-24 ENCOUNTER — HOSPITAL ENCOUNTER (EMERGENCY)
Dept: HOSPITAL 75 - ER FS | Age: 70
Discharge: HOME | End: 2020-11-24
Payer: MEDICARE

## 2020-11-24 VITALS — BODY MASS INDEX: 27.03 KG/M2 | HEIGHT: 65 IN | WEIGHT: 162.26 LBS

## 2020-11-24 VITALS — SYSTOLIC BLOOD PRESSURE: 136 MMHG | DIASTOLIC BLOOD PRESSURE: 83 MMHG

## 2020-11-24 DIAGNOSIS — Z88.1: ICD-10-CM

## 2020-11-24 DIAGNOSIS — Z88.2: ICD-10-CM

## 2020-11-24 DIAGNOSIS — I10: Primary | ICD-10-CM

## 2020-11-24 PROCEDURE — 99283 EMERGENCY DEPT VISIT LOW MDM: CPT

## 2020-11-24 NOTE — ED CARDIAC GENERAL
History of Present Illness


General


Chief Complaint:  Cardiac/General Problems


Stated Complaint:  HIGH BLOOD PRESSURE


Source:  patient





History of Present Illness


Date Seen by Provider:  Nov 24, 2020


Time Seen by Provider:  17:30


Initial Comments


Patient is a 70-year-old female with history of hypertension who presents with 

elevated blood pressure at home, 200/100. Patient denies dizziness 

lightheadedness, headache, chest pain palpitations or shortness of breath. 

Denies medications, changes Doser missed medications. No other acute symptoms or

complaints. Patient states she contacted her PCP and was instructed to come to 

the emergency department for further evaluation. No additional symptoms or 

complaints at this time.


Timing/Duration:  1-3 hours


Severity:  mild


Location:  other


Activities at Onset:  other


Prior CP/Workup:  other


Modifying Factors:  improves with other


Associated Systoms:  Other





Allergies and Home Medications


Allergies


Coded Allergies:  


     Sulfa (Sulfonamide Antibiotics) (Unverified  Allergy, Unknown, 9/16/20)


     cephalexin (Verified  Allergy, Unknown, 2/24/19)


     azithromycin (Verified  Adverse Reaction, Unknown, 2/24/19)


   


   STATES SHE CANT TAKE IT DUE TO TAKING PLAVIX


     doxycycline (Verified  Adverse Reaction, Unknown, REFLUX, 2/24/19)





Home Medications


Hydrocodone/Acetaminophen 1 Each Tablet, 1 EACH PO Q6H PRN for PAIN-SEVERE (8-

10)


   Prescribed by: OPHELIA CHINCHILLA on 9/21/20 1222


Ondansetron 4 Mg Tab.rapdis, 4 MG PO Q6H PRN for NAUSEA/VOMITING-1ST LINE


   Prescribed by: OPHELIA CHINCHILLA on 9/21/20 1222





Patient Home Medication List


Home Medication List Reviewed:  Yes





Review of Systems


Review of Systems


Constitutional:  see HPI


EENTM:  See HPI


Respiratory:  See HPI


Cardiovascular:  See HPI


Gastrointestinal:  See HPI


Genitourinary:  See HPI


Musculoskeletal:  see HPI


Skin:  see HPI


Psychiatric/Neurological:  See HPI


Endocrine:  See HPI


Hematologic/Lymphatic:  See HPI





All Other Systems Reviewed


Negative Unless Noted:  Yes





Past Medical-Social-Family Hx


Past Med/Social Hx:  Reviewed Nursing Past Med/Soc Hx


Patient Social History


Type Used:  Cigarettes


2nd Hand Smoke Exposure:  No


Recent Foreign Travel:  No


Contact w/Someone Who Travel:  No


Recent Hopitalizations:  No





Seasonal Allergies


Seasonal Allergies:  No





Past Medical History


Gallbladder, Orthopedic


Respiratory:  No


Cardiac:  Yes


Hypertension


Neurological:  Yes


Stroke


Genitourinary:  No


Gastrointestinal:  No


Musculoskeletal:  No


Endocrine:  Yes (PIT TUMOR)


Hyperthyroidism


Cancer:  No


Psychosocial:  No


Integumentary:  No





Physical Exam


Vital Signs


Capillary Refill :


Height, Weight, BMI


Height: 5'5.00"


Weight: 175lbs. oz. 79.643336ts; 33.00 BMI


Method:Stated


General Appearance:  No Apparent Distress, WD/WN


HEENT:  PERRL/EOMI, Pharynx Normal, Moist Mucous Membranes


Neck:  Full Range of Motion, Normal Inspection, Non Tender


Respiratory:  Lungs Clear


Cardiovascular:  Regular Rate, Rhythm


Neurologic/Psychiatric:  Alert, Oriented x3, No Motor/Sensory Deficits, Normal 

Mood/Affect, CNs II-XII Norm as Tested


Skin:  Normal Color, Warm/Dry





Focused Exam


Sepsis Stage:  Ruled Out





Progress/Results/Core Measures


Results/Orders


My Orders





Orders - SANDEE PERALTA DO


Clonidine  Tablet (Catapres  Tablet) (11/24/20 17:45)








Departure


Communication (Admissions)


Asymptomatic hypertension. Single dose of clonidine given. Patient instructed to

record daily blood pressure readings and follow up with PCP in the office in 7-

10 days for further management. Return precautions reviewed.





Impression





   Primary Impression:  


   Accelerated hypertension


Disposition:  01 HOME, SELF-CARE


Condition:  Stable/Unchanged





Departure-Patient Inst.


Decision time for Depature:  17:37





Add. Discharge Instructions:  


Please continue home blood pressure medications and take as directed. Record 

daily blood pressure values and follow up with PCP in 7-10 days for reevaluation

and further management.





All discharge instructions reviewed with patient and/or family. Voiced 

understanding.











SANDEE PERALTA DO                   Nov 24, 2020 17:37

## 2021-10-19 ENCOUNTER — HOSPITAL ENCOUNTER (OUTPATIENT)
Dept: HOSPITAL 75 - RAD | Age: 71
End: 2021-10-19
Attending: INTERNAL MEDICINE
Payer: MEDICARE

## 2021-10-19 DIAGNOSIS — M81.0: Primary | ICD-10-CM

## 2021-10-19 DIAGNOSIS — Z78.0: ICD-10-CM

## 2021-10-19 PROCEDURE — 77080 DXA BONE DENSITY AXIAL: CPT

## 2021-10-19 NOTE — DIAGNOSTIC IMAGING REPORT
INDICATION: 

Postmenopausal.



COMPARISON: 

03/26/2019.



FINDINGS: 

The bone mineral density of the spine, left hip, and femoral neck

were measured.



The total T score for the spine is -2.5. On the prior exam, the T

score was -2.3.



The total T score for the left hip is -3.2 as opposed to -2.7 on

the prior exam. The T score for the left femoral neck is -3.2. On

the prior exam, the T score was -3.3.



AP Spine L1-L4:

[BMD (g/cm2): 0.898] [T-Score: -2.5] [Z-Score: -1.2]

[BMD Previous: 0.923] [BMD % Change: -2.7]



LT Hip Neck:       

[BMD (g/cm2): 0.591] [T-Score: -3.2] [Z-Score: -1.7]



LT Hip Total:       

[BMD (g/cm2):0.601] [T-Score:-3.2] [Z-Score: -2.0]

[BMD Previous: 0.662] [BMD % Change: -9.2]



RT Hip Neck:      

[BMD (g/cm2):NA] [T-Score:NA] [Z-Score:NA]



RT Hip Total:      

[BMD (g/cm2):NA] [T-score:NA] [Z-Score:NA]

[BMD Previous:NA] [BMD % Change:NA]



*Indicates significant change from prior examination based on 95%

confidence level.



World Health Organization criteria for BMD interpretation

classify patients as Normal (T-score at or above -1.0),

Osteopenic (T-score between -1.0 and -2.5) or Osteoporotic

(T-score at or below -2.5).



LIMITATIONS AND MODIFICATION:  

None.



FRACTURE RISK (FRAX SCORE):

The ten year probability of (%): 

Major Osteoporotic Fracture: [31.8]

Hip Fracture: [12.0]



IMPRESSION:

1. There has been a decrease in the bone mineral density of the

spine and the left hip. These values now indicate osteoporosis.



2. There has been a minimal increase in the bone mineral density

of the left femoral neck. The T score value, however, still

indicates osteoporosis.



3. See below National Osteoporosis Foundation guidelines on when

to potentially initiate pharmacologic therapy. 



Based on the National Osteoporosis Foundation Guidelines,

pharmacologic treatment should be initiated in any of the

following, unless clinical conditions suggest otherwise:



*  Any patient with prior fragility fracture of the hip or

vertebrae. A spine fracture indicates 5X risk for subsequent

spine fracture and 2X risk for subsequent hip fracture.



*  Osteoporosis (T-score <-2.5).



*  Postmenopausal women and men age 50 and older with low bone

mass/osteopenia (T-score between -1.0 and -2.5) by DXA and

10-year major osteoporotic fracture greater than 20% or a 10-year

probability of hip fracture greater than 3%. These fracture risks

are supplied above in the FRAX score, if applicable.



*  Clinician judgement and/or patient preferences may indicate

treatment for people with 10-year fracture probabilities above or

below these levels.



Dictated by: 



  Dictated on workstation # [id number]